# Patient Record
Sex: FEMALE | Employment: FULL TIME | URBAN - NONMETROPOLITAN AREA
[De-identification: names, ages, dates, MRNs, and addresses within clinical notes are randomized per-mention and may not be internally consistent; named-entity substitution may affect disease eponyms.]

---

## 2024-02-29 ENCOUNTER — OFFICE VISIT (OUTPATIENT)
Dept: ENT CLINIC | Age: 29
End: 2024-02-29
Payer: COMMERCIAL

## 2024-02-29 VITALS
BODY MASS INDEX: 40.33 KG/M2 | TEMPERATURE: 97.9 F | DIASTOLIC BLOOD PRESSURE: 84 MMHG | WEIGHT: 242.1 LBS | SYSTOLIC BLOOD PRESSURE: 138 MMHG | HEART RATE: 111 BPM | HEIGHT: 65 IN | OXYGEN SATURATION: 100 % | RESPIRATION RATE: 16 BRPM

## 2024-02-29 DIAGNOSIS — H93.A1 PULSATILE TINNITUS OF RIGHT EAR: Primary | ICD-10-CM

## 2024-02-29 PROCEDURE — 1036F TOBACCO NON-USER: CPT | Performed by: NURSE PRACTITIONER

## 2024-02-29 PROCEDURE — 99203 OFFICE O/P NEW LOW 30 MIN: CPT | Performed by: NURSE PRACTITIONER

## 2024-02-29 PROCEDURE — G8417 CALC BMI ABV UP PARAM F/U: HCPCS | Performed by: NURSE PRACTITIONER

## 2024-02-29 PROCEDURE — G8427 DOCREV CUR MEDS BY ELIG CLIN: HCPCS | Performed by: NURSE PRACTITIONER

## 2024-02-29 PROCEDURE — G8484 FLU IMMUNIZE NO ADMIN: HCPCS | Performed by: NURSE PRACTITIONER

## 2024-02-29 RX ORDER — VALSARTAN 40 MG/1
80 TABLET ORAL DAILY
COMMUNITY
Start: 2024-02-06

## 2024-02-29 RX ORDER — FLUTICASONE PROPIONATE 50 MCG
SPRAY, SUSPENSION (ML) NASAL
COMMUNITY

## 2024-02-29 RX ORDER — OMEPRAZOLE 40 MG/1
40 CAPSULE, DELAYED RELEASE ORAL PRN
COMMUNITY
Start: 2024-02-06 | End: 2024-08-04

## 2024-02-29 RX ORDER — FLUTICASONE FUROATE, UMECLIDINIUM BROMIDE AND VILANTEROL TRIFENATATE 200; 62.5; 25 UG/1; UG/1; UG/1
1 POWDER RESPIRATORY (INHALATION) DAILY
COMMUNITY
Start: 2023-12-14 | End: 2024-12-13

## 2024-02-29 RX ORDER — PREDNISONE 10 MG/1
TABLET ORAL
COMMUNITY
Start: 2024-02-27 | End: 2024-03-04

## 2024-02-29 RX ORDER — VENLAFAXINE HYDROCHLORIDE 75 MG/1
75 CAPSULE, EXTENDED RELEASE ORAL DAILY
COMMUNITY
Start: 2023-12-05 | End: 2024-08-04

## 2024-02-29 RX ORDER — VENLAFAXINE HYDROCHLORIDE 37.5 MG/1
37.5 CAPSULE, EXTENDED RELEASE ORAL DAILY
COMMUNITY
Start: 2024-02-06 | End: 2024-08-04

## 2024-02-29 NOTE — PROGRESS NOTES
[facility] [place] [facility]  [facility] EAR, NOSE AND THROAT  [address]   [address]  [facility] [address]  Dept: [phone]  Dept Fax: [phone]  Loc: [phone]    HPI:     Justine Diaz is a 28 y.o. female new patient here for evaluation of tinnitus.  Patient was referred by Rc Castanon MD; notes reviewed.  Right tinnitus about a year ago while on anxiety medication; the tinnitus seemed to improve when medication was discontinued.  Within a few months, she was started on Effexor and the tinnitus resumed, although with a pulsatile quality.  The tinnitus has the quality of a heartbeat and matches her radial pulse.  It is constant and more noticeable in a quiet environment, such as bedtime when going to sleep.  If she increases her activity, it is louder.  If she presses on the side of her neck under the right ear, the tinnitus stops, then rushes as she relieves pressure.  She denies any pain, drainage or dizziness.  She wears an ear bud/plug in the right ear at work and answers the phone with the left.  No other relevant history or symptoms.     MRA Head/Neck W Contrast completed 11/15/2023 with no abnormalities.  This stemmed carotid US carotid that indicated some stenosis within the carotids.      History:     No Known Allergies  Current Outpatient Medications   Medication Sig Dispense Refill    venlafaxine (EFFEXOR XR) 37.5 MG extended release capsule Take 1 capsule by mouth daily      venlafaxine (EFFEXOR XR) 75 MG extended release capsule Take 1 capsule by mouth daily      TRELEGY ELLIPTA 200-62.5-25 MCG/ACT AEPB inhaler Inhale 1 puff into the lungs daily      valsartan (DIOVAN) 40 MG tablet Take 2 tablets by mouth daily      predniSONE (DELTASONE) 10 MG tablet Take by mouth      fluticasone (FLONASE ALLERGY RELIEF) 50 MCG/ACT nasal spray by Nasal route      omeprazole (PRILOSEC) 40 MG delayed release capsule Take 1 capsule by mouth as needed       No current

## 2024-04-19 ENCOUNTER — HOSPITAL ENCOUNTER (OUTPATIENT)
Dept: AUDIOLOGY | Age: 29
Discharge: HOME | End: 2024-04-19
Payer: COMMERCIAL

## 2024-04-19 PROCEDURE — 92567 TYMPANOMETRY: CPT | Performed by: AUDIOLOGIST

## 2024-04-19 PROCEDURE — 92557 COMPREHENSIVE HEARING TEST: CPT | Performed by: AUDIOLOGIST

## 2024-04-19 NOTE — PROGRESS NOTES
AUDIOLOGICAL EVALUATION      REASON FOR TESTING:  Audiometric evaluation per the request of Jeannette BRISENO, due to the diagnosis of pulsatile tinnitus of right ear. Tinnitus of the right ear began approximately one year ago. The patient noted that the tinnitus resolved when she stopped taking anxiety medication. When the patient started taking a new anxiety medication (Effexor), the tinnitus of the right ear returned. She denies any otalgia, aural pressure and vertigo. She experiences pressure sensation in her head. She denies any history of noise exposure.     OTOSCOPY: Clear canal and normal appearing tympanic membrane for both ears.     AUDIOGRAM        Reliability: Good    COMMENTS: Pure tone audiometry revealed normal hearing sensitivity for both ears. Word recognition ability is excellent at 100%, bilaterally. Tympanometry revealed normal peak pressure and normal middle ear compliance for both ears.      RECOMMENDATION(S):   1- Today's results were reviewed with the patient.  2- Today's report will be routed to Jeannette BRISENO for review and to contact the patient regarding follow up.

## 2024-05-02 ENCOUNTER — TELEPHONE (OUTPATIENT)
Dept: ENT CLINIC | Age: 29
End: 2024-05-02

## 2024-05-02 DIAGNOSIS — H93.A1 PULSATILE TINNITUS OF RIGHT EAR: Primary | ICD-10-CM

## 2024-05-02 NOTE — TELEPHONE ENCOUNTER
Patient called back and I gave her Crystal's message.  She will think about the imaging and call back tomorrow or next week with her decision.  Justine stated her schedule is busy right now so she will check that also.

## 2024-05-02 NOTE — TELEPHONE ENCOUNTER
Patient called our office today requesting the results of her audio/tymp. Audio was completed on 4/19/2024 and patient hasn't heard anything about her results yet. I informed the patient I would let Jeannette know she called and told patient that Jeannette is in surgery today and tomorrow and may not be able to call back until Monday.

## 2024-05-02 NOTE — TELEPHONE ENCOUNTER
Called and left VM to return call to office at her convenience.     Audiogram shows normal hearing and normal ear drum movement.  Next step in evaluating her symptoms would be to get imaging.  There may be an underlying structural problem like we had discussed.  If she wishes to pursue imaging, I will place orders.

## 2024-05-02 NOTE — TELEPHONE ENCOUNTER
I have at least 5 patients to call today regarding their Audio results - she is one of them! We have one more surgical case, then I will be calling her.  Okay to let her know that.

## 2024-05-13 NOTE — TELEPHONE ENCOUNTER
Patient called our office back and was wondering what imaging she would need but said it is ok to go ahead and order any imaging you recommend and we can call her back to let her know.

## 2024-05-23 ENCOUNTER — TELEPHONE (OUTPATIENT)
Dept: ENT CLINIC | Age: 29
End: 2024-05-23

## 2024-05-23 NOTE — TELEPHONE ENCOUNTER
Shania from [facility] called and stated that the patient is coming tomorrow at 7:15am for CT and its ordered with and without contrast and Shania stated that it's not necessary to do both and that their radiologist only do one or the other. Please advise

## 2024-05-23 NOTE — TELEPHONE ENCOUNTER
Called [facility] back and spoke to Shania to inform her of what Jeannette said. She verbalized understanding and thanked me.

## 2024-05-24 ENCOUNTER — HOSPITAL ENCOUNTER (OUTPATIENT)
Age: 29
End: 2024-05-24
Payer: COMMERCIAL

## 2024-05-24 ENCOUNTER — HOSPITAL ENCOUNTER (OUTPATIENT)
Dept: CT IMAGING | Age: 29
Discharge: HOME | End: 2024-05-24
Payer: COMMERCIAL

## 2024-05-24 DIAGNOSIS — H93.A1 PULSATILE TINNITUS OF RIGHT EAR: ICD-10-CM

## 2024-05-24 PROCEDURE — 6360000004 HC RX CONTRAST MEDICATION: Performed by: NURSE PRACTITIONER

## 2024-05-24 PROCEDURE — 70481 CT ORBIT/EAR/FOSSA W/DYE: CPT

## 2024-05-24 RX ADMIN — IOPAMIDOL 80 ML: 755 INJECTION, SOLUTION INTRAVENOUS at 08:00
